# Patient Record
Sex: FEMALE | Race: WHITE | NOT HISPANIC OR LATINO | URBAN - METROPOLITAN AREA
[De-identification: names, ages, dates, MRNs, and addresses within clinical notes are randomized per-mention and may not be internally consistent; named-entity substitution may affect disease eponyms.]

---

## 2020-04-29 ENCOUNTER — OUTPATIENT (OUTPATIENT)
Dept: OUTPATIENT SERVICES | Facility: HOSPITAL | Age: 30
LOS: 1 days | End: 2020-04-29
Payer: COMMERCIAL

## 2020-04-29 DIAGNOSIS — Z3A.00 WEEKS OF GESTATION OF PREGNANCY NOT SPECIFIED: ICD-10-CM

## 2020-04-29 DIAGNOSIS — O26.899 OTHER SPECIFIED PREGNANCY RELATED CONDITIONS, UNSPECIFIED TRIMESTER: ICD-10-CM

## 2020-04-29 LAB
ALBUMIN SERPL ELPH-MCNC: 3.5 G/DL — SIGNIFICANT CHANGE UP (ref 3.3–5)
ALP SERPL-CCNC: 167 U/L — HIGH (ref 40–120)
ALT FLD-CCNC: 58 U/L — HIGH (ref 10–45)
ANION GAP SERPL CALC-SCNC: 11 MMOL/L — SIGNIFICANT CHANGE UP (ref 5–17)
APTT BLD: 26.5 SEC — LOW (ref 27.5–36.3)
AST SERPL-CCNC: 38 U/L — SIGNIFICANT CHANGE UP (ref 10–40)
BASOPHILS # BLD AUTO: 0.03 K/UL — SIGNIFICANT CHANGE UP (ref 0–0.2)
BASOPHILS NFR BLD AUTO: 0.3 % — SIGNIFICANT CHANGE UP (ref 0–2)
BILIRUB SERPL-MCNC: 0.2 MG/DL — SIGNIFICANT CHANGE UP (ref 0.2–1.2)
BUN SERPL-MCNC: 7 MG/DL — SIGNIFICANT CHANGE UP (ref 7–23)
CALCIUM SERPL-MCNC: 9.5 MG/DL — SIGNIFICANT CHANGE UP (ref 8.4–10.5)
CHLORIDE SERPL-SCNC: 104 MMOL/L — SIGNIFICANT CHANGE UP (ref 96–108)
CO2 SERPL-SCNC: 21 MMOL/L — LOW (ref 22–31)
CREAT ?TM UR-MCNC: 68 MG/DL — SIGNIFICANT CHANGE UP
CREAT SERPL-MCNC: 0.68 MG/DL — SIGNIFICANT CHANGE UP (ref 0.5–1.3)
EOSINOPHIL # BLD AUTO: 0.06 K/UL — SIGNIFICANT CHANGE UP (ref 0–0.5)
EOSINOPHIL NFR BLD AUTO: 0.6 % — SIGNIFICANT CHANGE UP (ref 0–6)
FIBRINOGEN PPP-MCNC: 670 MG/DL — HIGH (ref 258–438)
GLUCOSE SERPL-MCNC: 90 MG/DL — SIGNIFICANT CHANGE UP (ref 70–99)
HCT VFR BLD CALC: 38.4 % — SIGNIFICANT CHANGE UP (ref 34.5–45)
HGB BLD-MCNC: 12.6 G/DL — SIGNIFICANT CHANGE UP (ref 11.5–15.5)
IMM GRANULOCYTES NFR BLD AUTO: 0.9 % — SIGNIFICANT CHANGE UP (ref 0–1.5)
INR BLD: 0.86 — LOW (ref 0.88–1.16)
LDH SERPL L TO P-CCNC: 149 U/L — SIGNIFICANT CHANGE UP (ref 50–242)
LYMPHOCYTES # BLD AUTO: 1.6 K/UL — SIGNIFICANT CHANGE UP (ref 1–3.3)
LYMPHOCYTES # BLD AUTO: 16.3 % — SIGNIFICANT CHANGE UP (ref 13–44)
MCHC RBC-ENTMCNC: 27.8 PG — SIGNIFICANT CHANGE UP (ref 27–34)
MCHC RBC-ENTMCNC: 32.8 GM/DL — SIGNIFICANT CHANGE UP (ref 32–36)
MCV RBC AUTO: 84.8 FL — SIGNIFICANT CHANGE UP (ref 80–100)
MONOCYTES # BLD AUTO: 0.58 K/UL — SIGNIFICANT CHANGE UP (ref 0–0.9)
MONOCYTES NFR BLD AUTO: 5.9 % — SIGNIFICANT CHANGE UP (ref 2–14)
NEUTROPHILS # BLD AUTO: 7.44 K/UL — HIGH (ref 1.8–7.4)
NEUTROPHILS NFR BLD AUTO: 76 % — SIGNIFICANT CHANGE UP (ref 43–77)
NRBC # BLD: 0 /100 WBCS — SIGNIFICANT CHANGE UP (ref 0–0)
PLATELET # BLD AUTO: 315 K/UL — SIGNIFICANT CHANGE UP (ref 150–400)
POTASSIUM SERPL-MCNC: 4.4 MMOL/L — SIGNIFICANT CHANGE UP (ref 3.5–5.3)
POTASSIUM SERPL-SCNC: 4.4 MMOL/L — SIGNIFICANT CHANGE UP (ref 3.5–5.3)
PROT ?TM UR-MCNC: 9 MG/DL — SIGNIFICANT CHANGE UP (ref 0–12)
PROT SERPL-MCNC: 6.5 G/DL — SIGNIFICANT CHANGE UP (ref 6–8.3)
PROT/CREAT UR-RTO: 0.1 RATIO — SIGNIFICANT CHANGE UP (ref 0–0.2)
PROTHROM AB SERPL-ACNC: 9.8 SEC — LOW (ref 10–12.9)
RBC # BLD: 4.53 M/UL — SIGNIFICANT CHANGE UP (ref 3.8–5.2)
RBC # FLD: 13.4 % — SIGNIFICANT CHANGE UP (ref 10.3–14.5)
SODIUM SERPL-SCNC: 136 MMOL/L — SIGNIFICANT CHANGE UP (ref 135–145)
URATE SERPL-MCNC: 5.1 MG/DL — SIGNIFICANT CHANGE UP (ref 2.5–7)
WBC # BLD: 9.8 K/UL — SIGNIFICANT CHANGE UP (ref 3.8–10.5)
WBC # FLD AUTO: 9.8 K/UL — SIGNIFICANT CHANGE UP (ref 3.8–10.5)

## 2020-04-29 PROCEDURE — 36415 COLL VENOUS BLD VENIPUNCTURE: CPT

## 2020-04-29 PROCEDURE — 80053 COMPREHEN METABOLIC PANEL: CPT

## 2020-04-29 PROCEDURE — 84550 ASSAY OF BLOOD/URIC ACID: CPT

## 2020-04-29 PROCEDURE — 83615 LACTATE (LD) (LDH) ENZYME: CPT

## 2020-04-29 PROCEDURE — 82570 ASSAY OF URINE CREATININE: CPT

## 2020-04-29 PROCEDURE — 99214 OFFICE O/P EST MOD 30 MIN: CPT

## 2020-04-29 PROCEDURE — 84156 ASSAY OF PROTEIN URINE: CPT

## 2020-04-29 PROCEDURE — 76981 USE PARENCHYMA: CPT | Mod: 26

## 2020-04-29 PROCEDURE — 85610 PROTHROMBIN TIME: CPT

## 2020-04-29 PROCEDURE — 85025 COMPLETE CBC W/AUTO DIFF WBC: CPT

## 2020-04-29 PROCEDURE — 76815 OB US LIMITED FETUS(S): CPT

## 2020-04-29 PROCEDURE — 99213 OFFICE O/P EST LOW 20 MIN: CPT | Mod: 25

## 2020-04-29 PROCEDURE — 85730 THROMBOPLASTIN TIME PARTIAL: CPT

## 2020-04-29 PROCEDURE — 85384 FIBRINOGEN ACTIVITY: CPT

## 2020-05-06 ENCOUNTER — INPATIENT (INPATIENT)
Facility: HOSPITAL | Age: 30
LOS: 1 days | Discharge: HOME | End: 2020-05-08
Attending: OBSTETRICS & GYNECOLOGY | Admitting: OBSTETRICS & GYNECOLOGY
Payer: COMMERCIAL

## 2020-05-06 VITALS — WEIGHT: 238.54 LBS | HEIGHT: 66 IN

## 2020-05-06 LAB
ALBUMIN SERPL ELPH-MCNC: 3.7 G/DL — SIGNIFICANT CHANGE UP (ref 3.3–5)
ALP SERPL-CCNC: 184 U/L — HIGH (ref 40–120)
ALT FLD-CCNC: 32 U/L — SIGNIFICANT CHANGE UP (ref 10–45)
ANION GAP SERPL CALC-SCNC: 15 MMOL/L — SIGNIFICANT CHANGE UP (ref 5–17)
APPEARANCE UR: ABNORMAL
APTT BLD: 25.9 SEC — LOW (ref 27.5–36.3)
AST SERPL-CCNC: 28 U/L — SIGNIFICANT CHANGE UP (ref 10–40)
BASOPHILS # BLD AUTO: 0.02 K/UL — SIGNIFICANT CHANGE UP (ref 0–0.2)
BASOPHILS NFR BLD AUTO: 0.2 % — SIGNIFICANT CHANGE UP (ref 0–2)
BILIRUB SERPL-MCNC: 0.2 MG/DL — SIGNIFICANT CHANGE UP (ref 0.2–1.2)
BILIRUB UR-MCNC: NEGATIVE — SIGNIFICANT CHANGE UP
BLD GP AB SCN SERPL QL: NEGATIVE — SIGNIFICANT CHANGE UP
BUN SERPL-MCNC: 7 MG/DL — SIGNIFICANT CHANGE UP (ref 7–23)
CALCIUM SERPL-MCNC: 9.7 MG/DL — SIGNIFICANT CHANGE UP (ref 8.4–10.5)
CHLORIDE SERPL-SCNC: 104 MMOL/L — SIGNIFICANT CHANGE UP (ref 96–108)
CO2 SERPL-SCNC: 19 MMOL/L — LOW (ref 22–31)
COLOR SPEC: YELLOW — SIGNIFICANT CHANGE UP
CREAT ?TM UR-MCNC: 22 MG/DL — SIGNIFICANT CHANGE UP
CREAT SERPL-MCNC: 0.61 MG/DL — SIGNIFICANT CHANGE UP (ref 0.5–1.3)
DIFF PNL FLD: ABNORMAL
EOSINOPHIL # BLD AUTO: 0.06 K/UL — SIGNIFICANT CHANGE UP (ref 0–0.5)
EOSINOPHIL NFR BLD AUTO: 0.6 % — SIGNIFICANT CHANGE UP (ref 0–6)
GLUCOSE SERPL-MCNC: 127 MG/DL — HIGH (ref 70–99)
GLUCOSE UR QL: NEGATIVE — SIGNIFICANT CHANGE UP
HCT VFR BLD CALC: 38.4 % — SIGNIFICANT CHANGE UP (ref 34.5–45)
HGB BLD-MCNC: 12.8 G/DL — SIGNIFICANT CHANGE UP (ref 11.5–15.5)
IMM GRANULOCYTES NFR BLD AUTO: 0.8 % — SIGNIFICANT CHANGE UP (ref 0–1.5)
INR BLD: 0.91 — SIGNIFICANT CHANGE UP (ref 0.88–1.16)
KETONES UR-MCNC: NEGATIVE — SIGNIFICANT CHANGE UP
LEUKOCYTE ESTERASE UR-ACNC: NEGATIVE — SIGNIFICANT CHANGE UP
LYMPHOCYTES # BLD AUTO: 1.81 K/UL — SIGNIFICANT CHANGE UP (ref 1–3.3)
LYMPHOCYTES # BLD AUTO: 17.1 % — SIGNIFICANT CHANGE UP (ref 13–44)
MCHC RBC-ENTMCNC: 28.1 PG — SIGNIFICANT CHANGE UP (ref 27–34)
MCHC RBC-ENTMCNC: 33.3 GM/DL — SIGNIFICANT CHANGE UP (ref 32–36)
MCV RBC AUTO: 84.4 FL — SIGNIFICANT CHANGE UP (ref 80–100)
MONOCYTES # BLD AUTO: 0.63 K/UL — SIGNIFICANT CHANGE UP (ref 0–0.9)
MONOCYTES NFR BLD AUTO: 5.9 % — SIGNIFICANT CHANGE UP (ref 2–14)
NEUTROPHILS # BLD AUTO: 8 K/UL — HIGH (ref 1.8–7.4)
NEUTROPHILS NFR BLD AUTO: 75.4 % — SIGNIFICANT CHANGE UP (ref 43–77)
NITRITE UR-MCNC: NEGATIVE — SIGNIFICANT CHANGE UP
NRBC # BLD: 0 /100 WBCS — SIGNIFICANT CHANGE UP (ref 0–0)
PH UR: 6.5 — SIGNIFICANT CHANGE UP (ref 5–8)
PLATELET # BLD AUTO: 317 K/UL — SIGNIFICANT CHANGE UP (ref 150–400)
POTASSIUM SERPL-MCNC: 3.8 MMOL/L — SIGNIFICANT CHANGE UP (ref 3.5–5.3)
POTASSIUM SERPL-SCNC: 3.8 MMOL/L — SIGNIFICANT CHANGE UP (ref 3.5–5.3)
PROT ?TM UR-MCNC: <4 MG/DL — SIGNIFICANT CHANGE UP (ref 0–12)
PROT SERPL-MCNC: 6.9 G/DL — SIGNIFICANT CHANGE UP (ref 6–8.3)
PROT UR-MCNC: NEGATIVE MG/DL — SIGNIFICANT CHANGE UP
PROT/CREAT UR-RTO: <0.2 RATIO — SIGNIFICANT CHANGE UP (ref 0–0.2)
PROTHROM AB SERPL-ACNC: 10.3 SEC — SIGNIFICANT CHANGE UP (ref 10–12.9)
RBC # BLD: 4.55 M/UL — SIGNIFICANT CHANGE UP (ref 3.8–5.2)
RBC # FLD: 13.2 % — SIGNIFICANT CHANGE UP (ref 10.3–14.5)
RH IG SCN BLD-IMP: POSITIVE — SIGNIFICANT CHANGE UP
RH IG SCN BLD-IMP: POSITIVE — SIGNIFICANT CHANGE UP
SARS-COV-2 RNA SPEC QL NAA+PROBE: SIGNIFICANT CHANGE UP
SODIUM SERPL-SCNC: 138 MMOL/L — SIGNIFICANT CHANGE UP (ref 135–145)
SP GR SPEC: 1.01 — SIGNIFICANT CHANGE UP (ref 1–1.03)
UROBILINOGEN FLD QL: 0.2 E.U./DL — SIGNIFICANT CHANGE UP
WBC # BLD: 10.6 K/UL — HIGH (ref 3.8–10.5)
WBC # FLD AUTO: 10.6 K/UL — HIGH (ref 3.8–10.5)

## 2020-05-06 RX ORDER — OXYTOCIN 10 UNIT/ML
333.33 VIAL (ML) INJECTION
Qty: 20 | Refills: 0 | Status: DISCONTINUED | OUTPATIENT
Start: 2020-05-06 | End: 2020-05-07

## 2020-05-06 RX ORDER — OXYTOCIN 10 UNIT/ML
1 VIAL (ML) INJECTION
Qty: 30 | Refills: 0 | Status: DISCONTINUED | OUTPATIENT
Start: 2020-05-06 | End: 2020-05-07

## 2020-05-06 RX ORDER — FENTANYL/BUPIVACAINE/NS/PF 2MCG/ML-.1
250 PLASTIC BAG, INJECTION (ML) INJECTION
Refills: 0 | Status: DISCONTINUED | OUTPATIENT
Start: 2020-05-06 | End: 2020-05-07

## 2020-05-06 RX ORDER — SODIUM CHLORIDE 9 MG/ML
1000 INJECTION, SOLUTION INTRAVENOUS
Refills: 0 | Status: DISCONTINUED | OUTPATIENT
Start: 2020-05-06 | End: 2020-05-07

## 2020-05-06 RX ORDER — CITRIC ACID/SODIUM CITRATE 300-500 MG
15 SOLUTION, ORAL ORAL EVERY 6 HOURS
Refills: 0 | Status: DISCONTINUED | OUTPATIENT
Start: 2020-05-06 | End: 2020-05-07

## 2020-05-06 RX ADMIN — Medication 1 MILLIUNIT(S)/MIN: at 17:05

## 2020-05-06 RX ADMIN — SODIUM CHLORIDE 125 MILLILITER(S): 9 INJECTION, SOLUTION INTRAVENOUS at 16:30

## 2020-05-07 LAB — T PALLIDUM AB TITR SER: NEGATIVE — SIGNIFICANT CHANGE UP

## 2020-05-07 PROCEDURE — 88307 TISSUE EXAM BY PATHOLOGIST: CPT | Mod: 26

## 2020-05-07 RX ORDER — MAGNESIUM HYDROXIDE 400 MG/1
30 TABLET, CHEWABLE ORAL
Refills: 0 | Status: DISCONTINUED | OUTPATIENT
Start: 2020-05-07 | End: 2020-05-08

## 2020-05-07 RX ORDER — DIPHENHYDRAMINE HCL 50 MG
25 CAPSULE ORAL EVERY 6 HOURS
Refills: 0 | Status: DISCONTINUED | OUTPATIENT
Start: 2020-05-07 | End: 2020-05-08

## 2020-05-07 RX ORDER — SIMETHICONE 80 MG/1
80 TABLET, CHEWABLE ORAL EVERY 4 HOURS
Refills: 0 | Status: DISCONTINUED | OUTPATIENT
Start: 2020-05-07 | End: 2020-05-08

## 2020-05-07 RX ORDER — SODIUM CHLORIDE 9 MG/ML
3 INJECTION INTRAMUSCULAR; INTRAVENOUS; SUBCUTANEOUS EVERY 8 HOURS
Refills: 0 | Status: DISCONTINUED | OUTPATIENT
Start: 2020-05-07 | End: 2020-05-08

## 2020-05-07 RX ORDER — OXYCODONE HYDROCHLORIDE 5 MG/1
5 TABLET ORAL
Refills: 0 | Status: DISCONTINUED | OUTPATIENT
Start: 2020-05-07 | End: 2020-05-08

## 2020-05-07 RX ORDER — TETANUS TOXOID, REDUCED DIPHTHERIA TOXOID AND ACELLULAR PERTUSSIS VACCINE, ADSORBED 5; 2.5; 8; 8; 2.5 [IU]/.5ML; [IU]/.5ML; UG/.5ML; UG/.5ML; UG/.5ML
0.5 SUSPENSION INTRAMUSCULAR ONCE
Refills: 0 | Status: DISCONTINUED | OUTPATIENT
Start: 2020-05-07 | End: 2020-05-08

## 2020-05-07 RX ORDER — BENZOCAINE 10 %
1 GEL (GRAM) MUCOUS MEMBRANE EVERY 6 HOURS
Refills: 0 | Status: DISCONTINUED | OUTPATIENT
Start: 2020-05-07 | End: 2020-05-08

## 2020-05-07 RX ORDER — KETOROLAC TROMETHAMINE 30 MG/ML
30 SYRINGE (ML) INJECTION ONCE
Refills: 0 | Status: DISCONTINUED | OUTPATIENT
Start: 2020-05-07 | End: 2020-05-07

## 2020-05-07 RX ORDER — AER TRAVELER 0.5 G/1
1 SOLUTION RECTAL; TOPICAL EVERY 4 HOURS
Refills: 0 | Status: DISCONTINUED | OUTPATIENT
Start: 2020-05-07 | End: 2020-05-08

## 2020-05-07 RX ORDER — OXYCODONE HYDROCHLORIDE 5 MG/1
5 TABLET ORAL ONCE
Refills: 0 | Status: DISCONTINUED | OUTPATIENT
Start: 2020-05-07 | End: 2020-05-08

## 2020-05-07 RX ORDER — PRAMOXINE HYDROCHLORIDE 150 MG/15G
1 AEROSOL, FOAM RECTAL EVERY 4 HOURS
Refills: 0 | Status: DISCONTINUED | OUTPATIENT
Start: 2020-05-07 | End: 2020-05-08

## 2020-05-07 RX ORDER — LANOLIN
1 OINTMENT (GRAM) TOPICAL EVERY 6 HOURS
Refills: 0 | Status: DISCONTINUED | OUTPATIENT
Start: 2020-05-07 | End: 2020-05-08

## 2020-05-07 RX ORDER — DIBUCAINE 1 %
1 OINTMENT (GRAM) RECTAL EVERY 6 HOURS
Refills: 0 | Status: DISCONTINUED | OUTPATIENT
Start: 2020-05-07 | End: 2020-05-08

## 2020-05-07 RX ORDER — HYDROCORTISONE 1 %
1 OINTMENT (GRAM) TOPICAL EVERY 6 HOURS
Refills: 0 | Status: DISCONTINUED | OUTPATIENT
Start: 2020-05-07 | End: 2020-05-08

## 2020-05-07 RX ORDER — IBUPROFEN 200 MG
600 TABLET ORAL EVERY 6 HOURS
Refills: 0 | Status: COMPLETED | OUTPATIENT
Start: 2020-05-07 | End: 2021-04-05

## 2020-05-07 RX ORDER — IBUPROFEN 200 MG
600 TABLET ORAL EVERY 6 HOURS
Refills: 0 | Status: DISCONTINUED | OUTPATIENT
Start: 2020-05-07 | End: 2020-05-08

## 2020-05-07 RX ORDER — OXYTOCIN 10 UNIT/ML
333.33 VIAL (ML) INJECTION
Qty: 20 | Refills: 0 | Status: DISCONTINUED | OUTPATIENT
Start: 2020-05-07 | End: 2020-05-08

## 2020-05-07 RX ORDER — ACETAMINOPHEN 500 MG
975 TABLET ORAL
Refills: 0 | Status: DISCONTINUED | OUTPATIENT
Start: 2020-05-07 | End: 2020-05-08

## 2020-05-07 RX ADMIN — Medication 1000 MILLIUNIT(S)/MIN: at 05:15

## 2020-05-07 RX ADMIN — Medication 975 MILLIGRAM(S): at 09:14

## 2020-05-07 RX ADMIN — Medication 30 MILLIGRAM(S): at 06:03

## 2020-05-07 RX ADMIN — Medication 600 MILLIGRAM(S): at 18:18

## 2020-05-07 RX ADMIN — SODIUM CHLORIDE 3 MILLILITER(S): 9 INJECTION INTRAMUSCULAR; INTRAVENOUS; SUBCUTANEOUS at 06:13

## 2020-05-07 RX ADMIN — Medication 975 MILLIGRAM(S): at 16:20

## 2020-05-07 RX ADMIN — Medication 600 MILLIGRAM(S): at 23:35

## 2020-05-07 RX ADMIN — Medication 975 MILLIGRAM(S): at 21:10

## 2020-05-07 NOTE — LACTATION INITIAL EVALUATION - LACTATION INTERVENTIONS
initiate hand expression routine/initiate skin to skin/Met dyad at ~12 hours. DTV/+mec. Positioning and latch strategies taught, and baby was able to latch deeply, sucking rhythmically between periods of rest. Flat/short nipples/brittny with stimulation and baby latches well. Breastfeeding basics and normal  behavior reviewed, including cluster feeding/second night syndrome. Encouraged frequent SSC and to breastfeed in response to cues at least 8-12x/day. Answered all questions for mother and partner. Encouraged to call for additional support as needed from RN or LC.

## 2020-05-08 VITALS
DIASTOLIC BLOOD PRESSURE: 80 MMHG | RESPIRATION RATE: 17 BRPM | OXYGEN SATURATION: 96 % | SYSTOLIC BLOOD PRESSURE: 122 MMHG | HEART RATE: 76 BPM | TEMPERATURE: 98 F

## 2020-05-08 LAB — SURGICAL PATHOLOGY STUDY: SIGNIFICANT CHANGE UP

## 2020-05-08 PROCEDURE — 88307 TISSUE EXAM BY PATHOLOGIST: CPT

## 2020-05-08 PROCEDURE — 84156 ASSAY OF PROTEIN URINE: CPT

## 2020-05-08 PROCEDURE — 85025 COMPLETE CBC W/AUTO DIFF WBC: CPT

## 2020-05-08 PROCEDURE — 87635 SARS-COV-2 COVID-19 AMP PRB: CPT

## 2020-05-08 PROCEDURE — 86901 BLOOD TYPING SEROLOGIC RH(D): CPT

## 2020-05-08 PROCEDURE — 86850 RBC ANTIBODY SCREEN: CPT

## 2020-05-08 PROCEDURE — 36415 COLL VENOUS BLD VENIPUNCTURE: CPT

## 2020-05-08 PROCEDURE — 80053 COMPREHEN METABOLIC PANEL: CPT

## 2020-05-08 PROCEDURE — 81001 URINALYSIS AUTO W/SCOPE: CPT

## 2020-05-08 PROCEDURE — 85730 THROMBOPLASTIN TIME PARTIAL: CPT

## 2020-05-08 PROCEDURE — 85610 PROTHROMBIN TIME: CPT

## 2020-05-08 PROCEDURE — 86780 TREPONEMA PALLIDUM: CPT

## 2020-05-08 PROCEDURE — 82570 ASSAY OF URINE CREATININE: CPT

## 2020-05-08 RX ORDER — ACETAMINOPHEN 500 MG
3 TABLET ORAL
Qty: 0 | Refills: 0 | DISCHARGE
Start: 2020-05-08

## 2020-05-08 RX ORDER — IBUPROFEN 200 MG
1 TABLET ORAL
Qty: 0 | Refills: 0 | DISCHARGE
Start: 2020-05-08

## 2020-05-08 RX ADMIN — Medication 1 APPLICATION(S): at 09:44

## 2020-05-08 RX ADMIN — Medication 975 MILLIGRAM(S): at 03:52

## 2020-05-08 RX ADMIN — Medication 975 MILLIGRAM(S): at 09:44

## 2020-05-08 RX ADMIN — Medication 600 MILLIGRAM(S): at 06:17

## 2020-05-08 RX ADMIN — Medication 1 TABLET(S): at 09:44

## 2020-05-08 RX ADMIN — Medication 600 MILLIGRAM(S): at 12:38

## 2020-05-08 RX ADMIN — Medication 1 SPRAY(S): at 09:44

## 2020-05-08 NOTE — DISCHARGE NOTE OB - HOSPITAL COURSE
Patient is status post a vaginal delivery c/b gestational hypertension. She had an uncomplicated postoperative course and has met her postoperative milestones appropriately.  Vitals are stable for discharge.

## 2020-05-08 NOTE — PROGRESS NOTE ADULT - SUBJECTIVE AND OBJECTIVE BOX
Patient evaluated at bedside this morning, resting comfortable in bed, no acute events overnight.  She reports pain is well controlled with tylenol and motrin.  She denies headache, dizziness, chest pain, palpitations, shortness of breath, nausea, vomiting, heavy vaginal bleeding or perineal discomfort. Reports decrease in amount of vaginal bleeding and denies clots.  She has been ambulating without assistance, voiding spontaneously, and is breastfeeding.   Tolerating food well, without nausea/vomit.  Passing flatus.     Physical Exam:  T(C): 36.7 (05-07-20 @ 21:25), Max: 36.7 (05-07-20 @ 21:25)  HR: 76 (05-07-20 @ 21:25) (75 - 76)  BP: 116/78 (05-07-20 @ 21:25) (116/78 - 118/78)  RR: 18 (05-07-20 @ 21:25) (18 - 18)  SpO2: 97% (05-07-20 @ 21:25) (97% - 97%)    GA: NAD, A&O x 3  CV: RRR, no murmurs, rubs, or gallops  Pulm: clear breath sounds throughout, no rales, rhonchi, wheezes  Abd: + BS, soft, nontender, nondistended, no rebound or guarding, uterus firm at midline and below umbilicus  Extremities: no swelling or calf tenderness  Perineum: normal lochia, intact, healing well, no hematoma                          12.8   10.60 )-----------( 317      ( 06 May 2020 15:31 )             38.4     05-06    138  |  104  |  7   ----------------------------<  127<H>  3.8   |  19<L>  |  0.61    Ca    9.7      06 May 2020 15:31    TPro  6.9  /  Alb  3.7  /  TBili  0.2  /  DBili  x   /  AST  28  /  ALT  32  /  AlkPhos  184<H>  05-06    acetaminophen   Tablet .. 975 milliGRAM(s) Oral <User Schedule>  benzocaine 20%/menthol 0.5% Spray 1 Spray(s) Topical every 6 hours PRN  dibucaine 1% Ointment 1 Application(s) Topical every 6 hours PRN  diphenhydrAMINE 25 milliGRAM(s) Oral every 6 hours PRN  diphtheria/tetanus/pertussis (acellular) Vaccine (ADAcel) 0.5 milliLiter(s) IntraMuscular once  hydrocortisone 1% Cream 1 Application(s) Topical every 6 hours PRN  ibuprofen  Tablet. 600 milliGRAM(s) Oral every 6 hours  lanolin Ointment 1 Application(s) Topical every 6 hours PRN  magnesium hydroxide Suspension 30 milliLiter(s) Oral two times a day PRN  oxyCODONE    IR 5 milliGRAM(s) Oral every 3 hours PRN  oxyCODONE    IR 5 milliGRAM(s) Oral once PRN  oxytocin Infusion 333.333 milliUNIT(s)/Min IV Continuous <Continuous>  pramoxine 1%/zinc 5% Cream 1 Application(s) Topical every 4 hours PRN  prenatal multivitamin 1 Tablet(s) Oral daily  simethicone 80 milliGRAM(s) Chew every 4 hours PRN  sodium chloride 0.9% lock flush 3 milliLiter(s) IV Push every 8 hours  witch hazel Pads 1 Application(s) Topical every 4 hours PRN

## 2020-05-08 NOTE — DISCHARGE NOTE OB - MATERIALS PROVIDED
[facility] [place] Screening Program/Breastfeeding Log/Back To Sleep Handout/[place]  Immunization Record/Bottle Feeding Log/Breastfeeding Guide and Packet/Guide to Postpartum Care/[facility] Hearing Screen Program/Discharge Medication Information for Patients and Families Pocket Guide/Vaccinations/Tdap Vaccination (VIS Pub Date: 2012)/Breastfeeding Mother’s Support Group Information/Shaken Baby Prevention Handout/Birth Certificate Instructions

## 2020-05-08 NOTE — PROGRESS NOTE ADULT - ATTENDING COMMENTS
Patient seen and examined, agree with assessment and plan.  PPD1 s/p LFD, doing well, cleared for DC home today.    Dr. Kulkarni, Attending

## 2020-05-08 NOTE — DISCHARGE NOTE OB - CARE PROVIDER_API CALL
Barbara Guerra)  Obstetrics and Gynecology  [address] [place] [address]  Phone: [phone]  Fax: [phone]  Follow Up Time:

## 2020-05-08 NOTE — DISCHARGE NOTE OB - PLAN OF CARE
discharge home Take Motrin 600mg every 6 hours and/or tylenol 650mg every 6 hours as needed for pain. Call your OB to schedule a follow up appointment in 6 weeks. Nothing per vagina until cleared by your OB - no intercourse, douching, tampons, etc.  Call your OB if you experience severe abdominal pain not improved by oral pain medications, heavy bright red vaginal bleeding saturating more than 1 pad per hour, or fever greater than 100.4F. Consider contraception options to be discussed with your OB. blood pressure monitoring Follow up with your OB in one week for a Bloodpressure check.  Purchase electronic blood pressure cuff at your local pharmacy. Check blood pressure 3x a day. If bp >160/110, you develop a headache not relieved by tylenol, visual disturbances, or right upper abdominal pain, call your doctor or the hospital, or go to your nearest emergency room. Regular diet, normal activity, nothing in the vagina for 6 weeks--no sex, tampons, tub baths, or swimming pools.

## 2020-05-08 NOTE — PROGRESS NOTE ADULT - ASSESSMENT
A/P 29y s/p FAVD, PPD #1  , stable, meeting postpartum milestones   - Pain: well controlled on motrin and tylenol  - GI: Tolerating regular diet   - : urinating without difficulty/pain  - DVT prophylaxis: ambulating frequently  - Dispo: PPD 2, unless otherwise specified

## 2020-05-08 NOTE — DISCHARGE NOTE OB - MEDICATION SUMMARY - MEDICATIONS TO TAKE
I will START or STAY ON the medications listed below when I get home from the hospital:    acetaminophen 325 mg oral tablet  -- 3 tab(s) by mouth   -- Indication: For for pain as needed    ibuprofen 600 mg oral tablet  -- 1 tab(s) by mouth every 6 hours  -- Indication: For for pain as needed

## 2020-05-08 NOTE — DISCHARGE NOTE OB - CARE PLAN
Principal Discharge DX:	Postpartum state  Goal:	discharge home  Assessment and plan of treatment:	Take Motrin 600mg every 6 hours and/or tylenol 650mg every 6 hours as needed for pain. Call your OB to schedule a follow up appointment in 6 weeks. Nothing per vagina until cleared by your OB - no intercourse, douching, tampons, etc.  Call your OB if you experience severe abdominal pain not improved by oral pain medications, heavy bright red vaginal bleeding saturating more than 1 pad per hour, or fever greater than 100.4F. Consider contraception options to be discussed with your OB.  Secondary Diagnosis:	Gestational hypertension, third trimester  Goal:	blood pressure monitoring  Assessment and plan of treatment:	Follow up with your OB in one week for a Bloodpressure check.  Purchase electronic blood pressure cuff at your local pharmacy. Check blood pressure 3x a day. If bp >160/110, you develop a headache not relieved by tylenol, visual disturbances, or right upper abdominal pain, call your doctor or the hospital, or go to your nearest emergency room. Regular diet, normal activity, nothing in the vagina for 6 weeks--no sex, tampons, tub baths, or swimming pools.

## 2020-05-08 NOTE — DISCHARGE NOTE OB - PATIENT PORTAL LINK FT
You can access the FollowMyHealth Patient Portal offered by [facility] by registering at the following website: [email or web address]. By joining [email or web address] FollowMyHealth portal, you will also be able to view your health information using other applications (apps) compatible with our system.

## 2020-05-11 DIAGNOSIS — Z3A.41 41 WEEKS GESTATION OF PREGNANCY: ICD-10-CM

## 2020-05-11 DIAGNOSIS — O41.03X0 OLIGOHYDRAMNIOS, THIRD TRIMESTER, NOT APPLICABLE OR UNSPECIFIED: ICD-10-CM

## 2020-05-11 DIAGNOSIS — O48.0 POST-TERM PREGNANCY: ICD-10-CM

## 2023-01-04 ENCOUNTER — INPATIENT (INPATIENT)
Facility: HOSPITAL | Age: 33
LOS: 1 days | Discharge: HOME | End: 2023-01-06
Attending: OBSTETRICS & GYNECOLOGY | Admitting: OBSTETRICS & GYNECOLOGY
Payer: COMMERCIAL

## 2023-01-04 DIAGNOSIS — Z91.018 ALLERGY TO OTHER FOODS: ICD-10-CM

## 2023-01-04 DIAGNOSIS — Z28.21 IMMUNIZATION NOT CARRIED OUT BECAUSE OF PATIENT REFUSAL: ICD-10-CM

## 2023-01-04 DIAGNOSIS — Z3A.39 39 WEEKS GESTATION OF PREGNANCY: ICD-10-CM

## 2023-01-04 DIAGNOSIS — O36.63X0 MATERNAL CARE FOR EXCESSIVE FETAL GROWTH, THIRD TRIMESTER, NOT APPLICABLE OR UNSPECIFIED: ICD-10-CM

## 2023-01-04 DIAGNOSIS — R21 RASH AND OTHER NONSPECIFIC SKIN ERUPTION: ICD-10-CM

## 2023-01-04 DIAGNOSIS — Z28.09 IMMUNIZATION NOT CARRIED OUT BECAUSE OF OTHER CONTRAINDICATION: ICD-10-CM

## 2023-01-04 RX ORDER — CHLORHEXIDINE GLUCONATE 213 G/1000ML
1 SOLUTION TOPICAL ONCE
Refills: 0 | Status: DISCONTINUED | OUTPATIENT
Start: 2023-01-04 | End: 2023-01-05

## 2023-01-04 RX ORDER — CITRIC ACID/SODIUM CITRATE 300-500 MG
15 SOLUTION, ORAL ORAL EVERY 6 HOURS
Refills: 0 | Status: DISCONTINUED | OUTPATIENT
Start: 2023-01-04 | End: 2023-01-05

## 2023-01-04 RX ORDER — SODIUM CHLORIDE 9 MG/ML
1000 INJECTION, SOLUTION INTRAVENOUS
Refills: 0 | Status: DISCONTINUED | OUTPATIENT
Start: 2023-01-04 | End: 2023-01-05

## 2023-01-04 RX ORDER — OXYTOCIN 10 UNIT/ML
333.33 VIAL (ML) INJECTION
Qty: 20 | Refills: 0 | Status: DISCONTINUED | OUTPATIENT
Start: 2023-01-04 | End: 2023-01-05

## 2023-01-04 RX ADMIN — SODIUM CHLORIDE 125 MILLILITER(S): 9 INJECTION, SOLUTION INTRAVENOUS at 23:42

## 2023-01-05 VITALS — HEIGHT: 66 IN | WEIGHT: 270.07 LBS

## 2023-01-05 LAB
BASOPHILS # BLD AUTO: 0.04 K/UL — SIGNIFICANT CHANGE UP (ref 0–0.2)
BASOPHILS NFR BLD AUTO: 0.3 % — SIGNIFICANT CHANGE UP (ref 0–2)
BLD GP AB SCN SERPL QL: NEGATIVE — SIGNIFICANT CHANGE UP
COVID-19 SPIKE DOMAIN AB INTERP: POSITIVE
COVID-19 SPIKE DOMAIN ANTIBODY RESULT: >250 U/ML — HIGH
EOSINOPHIL # BLD AUTO: 0.12 K/UL — SIGNIFICANT CHANGE UP (ref 0–0.5)
EOSINOPHIL NFR BLD AUTO: 1 % — SIGNIFICANT CHANGE UP (ref 0–6)
HCT VFR BLD CALC: 36.9 % — SIGNIFICANT CHANGE UP (ref 34.5–45)
HGB BLD-MCNC: 12 G/DL — SIGNIFICANT CHANGE UP (ref 11.5–15.5)
IMM GRANULOCYTES NFR BLD AUTO: 1.1 % — HIGH (ref 0–0.9)
LYMPHOCYTES # BLD AUTO: 19.3 % — SIGNIFICANT CHANGE UP (ref 13–44)
LYMPHOCYTES # BLD AUTO: 2.28 K/UL — SIGNIFICANT CHANGE UP (ref 1–3.3)
MCHC RBC-ENTMCNC: 27.5 PG — SIGNIFICANT CHANGE UP (ref 27–34)
MCHC RBC-ENTMCNC: 32.5 GM/DL — SIGNIFICANT CHANGE UP (ref 32–36)
MCV RBC AUTO: 84.6 FL — SIGNIFICANT CHANGE UP (ref 80–100)
MONOCYTES # BLD AUTO: 0.82 K/UL — SIGNIFICANT CHANGE UP (ref 0–0.9)
MONOCYTES NFR BLD AUTO: 6.9 % — SIGNIFICANT CHANGE UP (ref 2–14)
NEUTROPHILS # BLD AUTO: 8.42 K/UL — HIGH (ref 1.8–7.4)
NEUTROPHILS NFR BLD AUTO: 71.4 % — SIGNIFICANT CHANGE UP (ref 43–77)
NRBC # BLD: 0 /100 WBCS — SIGNIFICANT CHANGE UP (ref 0–0)
PLATELET # BLD AUTO: 335 K/UL — SIGNIFICANT CHANGE UP (ref 150–400)
RBC # BLD: 4.36 M/UL — SIGNIFICANT CHANGE UP (ref 3.8–5.2)
RBC # FLD: 13.2 % — SIGNIFICANT CHANGE UP (ref 10.3–14.5)
RH IG SCN BLD-IMP: POSITIVE — SIGNIFICANT CHANGE UP
RH IG SCN BLD-IMP: POSITIVE — SIGNIFICANT CHANGE UP
SARS-COV-2 IGG+IGM SERPL QL IA: >250 U/ML — HIGH
SARS-COV-2 IGG+IGM SERPL QL IA: POSITIVE
T PALLIDUM AB TITR SER: NEGATIVE — SIGNIFICANT CHANGE UP
WBC # BLD: 11.81 K/UL — HIGH (ref 3.8–10.5)
WBC # FLD AUTO: 11.81 K/UL — HIGH (ref 3.8–10.5)

## 2023-01-05 RX ORDER — BENZOCAINE 10 %
1 GEL (GRAM) MUCOUS MEMBRANE EVERY 6 HOURS
Refills: 0 | Status: DISCONTINUED | OUTPATIENT
Start: 2023-01-05 | End: 2023-01-06

## 2023-01-05 RX ORDER — TETANUS TOXOID, REDUCED DIPHTHERIA TOXOID AND ACELLULAR PERTUSSIS VACCINE, ADSORBED 5; 2.5; 8; 8; 2.5 [IU]/.5ML; [IU]/.5ML; UG/.5ML; UG/.5ML; UG/.5ML
0.5 SUSPENSION INTRAMUSCULAR ONCE
Refills: 0 | Status: DISCONTINUED | OUTPATIENT
Start: 2023-01-05 | End: 2023-01-06

## 2023-01-05 RX ORDER — SIMETHICONE 80 MG/1
80 TABLET, CHEWABLE ORAL EVERY 4 HOURS
Refills: 0 | Status: DISCONTINUED | OUTPATIENT
Start: 2023-01-05 | End: 2023-01-06

## 2023-01-05 RX ORDER — LANOLIN
1 OINTMENT (GRAM) TOPICAL EVERY 6 HOURS
Refills: 0 | Status: DISCONTINUED | OUTPATIENT
Start: 2023-01-05 | End: 2023-01-06

## 2023-01-05 RX ORDER — OXYTOCIN 10 UNIT/ML
6 VIAL (ML) INJECTION
Qty: 30 | Refills: 0 | Status: DISCONTINUED | OUTPATIENT
Start: 2023-01-05 | End: 2023-01-06

## 2023-01-05 RX ORDER — AER TRAVELER 0.5 G/1
1 SOLUTION RECTAL; TOPICAL EVERY 4 HOURS
Refills: 0 | Status: DISCONTINUED | OUTPATIENT
Start: 2023-01-05 | End: 2023-01-06

## 2023-01-05 RX ORDER — KETOROLAC TROMETHAMINE 30 MG/ML
30 SYRINGE (ML) INJECTION ONCE
Refills: 0 | Status: DISCONTINUED | OUTPATIENT
Start: 2023-01-05 | End: 2023-01-05

## 2023-01-05 RX ORDER — OXYTOCIN 10 UNIT/ML
41.67 VIAL (ML) INJECTION
Qty: 20 | Refills: 0 | Status: DISCONTINUED | OUTPATIENT
Start: 2023-01-05 | End: 2023-01-06

## 2023-01-05 RX ORDER — HYDROCORTISONE 1 %
1 OINTMENT (GRAM) TOPICAL EVERY 6 HOURS
Refills: 0 | Status: DISCONTINUED | OUTPATIENT
Start: 2023-01-05 | End: 2023-01-06

## 2023-01-05 RX ORDER — MAGNESIUM HYDROXIDE 400 MG/1
30 TABLET, CHEWABLE ORAL
Refills: 0 | Status: DISCONTINUED | OUTPATIENT
Start: 2023-01-05 | End: 2023-01-06

## 2023-01-05 RX ORDER — DIBUCAINE 1 %
1 OINTMENT (GRAM) RECTAL EVERY 6 HOURS
Refills: 0 | Status: DISCONTINUED | OUTPATIENT
Start: 2023-01-05 | End: 2023-01-06

## 2023-01-05 RX ORDER — PRAMOXINE HYDROCHLORIDE 150 MG/15G
1 AEROSOL, FOAM RECTAL EVERY 4 HOURS
Refills: 0 | Status: DISCONTINUED | OUTPATIENT
Start: 2023-01-05 | End: 2023-01-06

## 2023-01-05 RX ORDER — OXYTOCIN 10 UNIT/ML
VIAL (ML) INJECTION
Qty: 30 | Refills: 0 | Status: DISCONTINUED | OUTPATIENT
Start: 2023-01-05 | End: 2023-01-05

## 2023-01-05 RX ORDER — FENTANYL/BUPIVACAINE/NS/PF 2MCG/ML-.1
250 PLASTIC BAG, INJECTION (ML) INJECTION
Refills: 0 | Status: DISCONTINUED | OUTPATIENT
Start: 2023-01-05 | End: 2023-01-06

## 2023-01-05 RX ORDER — IBUPROFEN 200 MG
600 TABLET ORAL EVERY 6 HOURS
Refills: 0 | Status: DISCONTINUED | OUTPATIENT
Start: 2023-01-05 | End: 2023-01-06

## 2023-01-05 RX ORDER — ACETAMINOPHEN 500 MG
1000 TABLET ORAL ONCE
Refills: 0 | Status: COMPLETED | OUTPATIENT
Start: 2023-01-05 | End: 2023-01-05

## 2023-01-05 RX ORDER — DIPHENHYDRAMINE HCL 50 MG
25 CAPSULE ORAL EVERY 6 HOURS
Refills: 0 | Status: DISCONTINUED | OUTPATIENT
Start: 2023-01-05 | End: 2023-01-06

## 2023-01-05 RX ORDER — ACETAMINOPHEN 500 MG
975 TABLET ORAL
Refills: 0 | Status: DISCONTINUED | OUTPATIENT
Start: 2023-01-05 | End: 2023-01-06

## 2023-01-05 RX ORDER — IBUPROFEN 200 MG
600 TABLET ORAL EVERY 6 HOURS
Refills: 0 | Status: COMPLETED | OUTPATIENT
Start: 2023-01-05 | End: 2023-12-04

## 2023-01-05 RX ORDER — SODIUM CHLORIDE 9 MG/ML
3 INJECTION INTRAMUSCULAR; INTRAVENOUS; SUBCUTANEOUS EVERY 8 HOURS
Refills: 0 | Status: DISCONTINUED | OUTPATIENT
Start: 2023-01-05 | End: 2023-01-06

## 2023-01-05 RX ADMIN — Medication 1 APPLICATION(S): at 22:09

## 2023-01-05 RX ADMIN — Medication 400 MILLIGRAM(S): at 06:28

## 2023-01-05 RX ADMIN — Medication 125 MILLIUNIT(S)/MIN: at 10:57

## 2023-01-05 RX ADMIN — Medication 2 MILLIUNIT(S)/MIN: at 06:10

## 2023-01-05 RX ADMIN — SODIUM CHLORIDE 125 MILLILITER(S): 9 INJECTION, SOLUTION INTRAVENOUS at 05:51

## 2023-01-05 RX ADMIN — Medication 1 SPRAY(S): at 22:09

## 2023-01-05 RX ADMIN — Medication 975 MILLIGRAM(S): at 22:15

## 2023-01-05 RX ADMIN — Medication 30 MILLIGRAM(S): at 11:46

## 2023-01-05 RX ADMIN — Medication 1000 MILLIUNIT(S)/MIN: at 12:09

## 2023-01-05 RX ADMIN — Medication 600 MILLIGRAM(S): at 18:11

## 2023-01-05 RX ADMIN — SODIUM CHLORIDE 3 MILLILITER(S): 9 INJECTION INTRAMUSCULAR; INTRAVENOUS; SUBCUTANEOUS at 21:55

## 2023-01-05 RX ADMIN — Medication 975 MILLIGRAM(S): at 21:46

## 2023-01-05 RX ADMIN — Medication 975 MILLIGRAM(S): at 15:25

## 2023-01-05 RX ADMIN — SODIUM CHLORIDE 125 MILLILITER(S): 9 INJECTION, SOLUTION INTRAVENOUS at 09:40

## 2023-01-05 NOTE — LACTATION INITIAL EVALUATION - LACTATION INTERVENTIONS
initiate/review safe skin-to-skin/initiate/review hand expression/initiate/review pumping guidelines and safe milk handling/initiate/review techniques for position and latch/post discharge community resources provided/initiate/review supplementation plan due to medical indications/review techniques to increase milk supply/review techniques to manage sore nipples/engorgement/initiate/review breast massage/compression/reviewed components of an effective feeding and at least 8 effective feedings per day required/reviewed importance of monitoring infant diapers, the breastfeeding log, and minimum output each day/reviewed risks of unnecessary formula supplementation/reviewed risks of artificial nipples/reviewed strategies to transition to breastfeeding only/reviewed benefits and recommendations for rooming in/reviewed feeding on demand/by cue at least 8 times a day/recommended follow-up with pediatrician within 24 hours of discharge/reviewed indications of inadequate milk transfer that would require supplementation

## 2023-01-05 NOTE — PATIENT PROFILE OB - FUNCTIONAL ASSESSMENT - BASIC MOBILITY 6.
4-calculated by average/Not able to assess (calculate score using [facility] averaging method)

## 2023-01-05 NOTE — PATIENT PROFILE OB - FALL HARM RISK - UNIVERSAL INTERVENTIONS
Bed in lowest position, wheels locked, appropriate side rails in place/Call bell, personal items and telephone in reach/Instruct patient to call for assistance before getting out of bed or chair/Non-slip footwear when patient is out of bed/[place] to call system/Physically safe environment - no spills, clutter or unnecessary equipment/Purposeful Proactive Rounding/Room/bathroom lighting operational, light cord in reach

## 2023-01-05 NOTE — LACTATION INITIAL EVALUATION - NS LACT CON REASON FOR REQ
Dyad seen at 11hrs post birth. Mom  1st child (1yrs old) for almost 2yrs with full supply. Demonstrate hand expressing colostrum, bilateral copious expressible colostrum present. Mom comfortable with latching and positioning. Discuss how to maintain and increase breast milk supply and benefits of exclusively breastfeeding./multiparous mom

## 2023-01-05 NOTE — PATIENT PROFILE OB - BLOOD TYPED: DATE, OB PROFILE
Pt called and stated she was seen on 09/29/2017. Pt stated at that visit she complained of having a cough for over a month. Pt stated she was sent to labor and delivery and was given Flovent and Promethazine with codeine. Pt states that labor and delivery told her to call Dr. Euceda if her cough did not get better within a week. Pt states she was calling to inform Dr. Euceda that her cough is still present and is not any better. Pt states when she takes the promethazine-codeine she becomes very drowsy.    24-Jun-2023

## 2023-01-06 VITALS
SYSTOLIC BLOOD PRESSURE: 130 MMHG | RESPIRATION RATE: 18 BRPM | OXYGEN SATURATION: 97 % | HEART RATE: 82 BPM | DIASTOLIC BLOOD PRESSURE: 84 MMHG | TEMPERATURE: 98 F

## 2023-01-06 PROCEDURE — 86769 SARS-COV-2 COVID-19 ANTIBODY: CPT

## 2023-01-06 PROCEDURE — 36415 COLL VENOUS BLD VENIPUNCTURE: CPT

## 2023-01-06 PROCEDURE — 86901 BLOOD TYPING SEROLOGIC RH(D): CPT

## 2023-01-06 PROCEDURE — 59050 FETAL MONITOR W/REPORT: CPT

## 2023-01-06 PROCEDURE — 86900 BLOOD TYPING SEROLOGIC ABO: CPT

## 2023-01-06 PROCEDURE — 85025 COMPLETE CBC W/AUTO DIFF WBC: CPT

## 2023-01-06 PROCEDURE — 86780 TREPONEMA PALLIDUM: CPT

## 2023-01-06 PROCEDURE — 86850 RBC ANTIBODY SCREEN: CPT

## 2023-01-06 RX ORDER — ACETAMINOPHEN 500 MG
3 TABLET ORAL
Qty: 0 | Refills: 0 | DISCHARGE
Start: 2023-01-06

## 2023-01-06 RX ORDER — HYDROCORTISONE 1 %
1 OINTMENT (GRAM) TOPICAL
Refills: 0 | Status: DISCONTINUED | OUTPATIENT
Start: 2023-01-06 | End: 2023-01-06

## 2023-01-06 RX ORDER — IBUPROFEN 200 MG
1 TABLET ORAL
Qty: 0 | Refills: 0 | DISCHARGE
Start: 2023-01-06

## 2023-01-06 RX ADMIN — Medication 600 MILLIGRAM(S): at 12:45

## 2023-01-06 RX ADMIN — Medication 975 MILLIGRAM(S): at 03:45

## 2023-01-06 RX ADMIN — Medication 600 MILLIGRAM(S): at 00:55

## 2023-01-06 RX ADMIN — Medication 600 MILLIGRAM(S): at 07:00

## 2023-01-06 RX ADMIN — Medication 600 MILLIGRAM(S): at 06:29

## 2023-01-06 RX ADMIN — Medication 975 MILLIGRAM(S): at 03:17

## 2023-01-06 RX ADMIN — Medication 1 TABLET(S): at 12:46

## 2023-01-06 RX ADMIN — Medication 600 MILLIGRAM(S): at 00:34

## 2023-01-06 NOTE — PROGRESS NOTE ADULT - SUBJECTIVE AND OBJECTIVE BOX
Patient evaluated at bedside this morning, resting comfortable in bed, no acute events overnight.  She reports pain is well controlled with tylenol and motrin.  She denies headache, dizziness, chest pain, palpitations, shortness of breath, nausea, vomiting, fever, chills, heavy vaginal bleeding. She has been ambulating without assistance, voiding spontaneously.  Tolerating food well, without nausea/vomit.      Physical Exam:  T(C): 36.4 (01-06-23 @ 06:13), Max: 36.7 (01-05-23 @ 21:26)  HR: 76 (01-06-23 @ 06:13) (76 - 83)  BP: 115/82 (01-06-23 @ 06:13) (96/62 - 119/79)  RR: 17 (01-06-23 @ 06:13) (16 - 17)  SpO2: 96% (01-06-23 @ 06:13) (95% - 96%)    GA: NAD, A&O x 3  Pulm: no increased work of breathing  Abd: soft, nontender, nondistended, no rebound or guarding, uterus firm.  Extremities: no swelling or calf tenderness                          12.0   11.81 )-----------( 335      ( 05 Jan 2023 00:02 )             36.9           acetaminophen     Tablet .. 975 milliGRAM(s) Oral <User Schedule>  benzocaine 20%/menthol 0.5% Spray 1 Spray(s) Topical every 6 hours PRN  dibucaine 1% Ointment 1 Application(s) Topical every 6 hours PRN  diphenhydrAMINE 25 milliGRAM(s) Oral every 6 hours PRN  diphtheria/tetanus/pertussis (acellular) Vaccine (Adacel) 0.5 milliLiter(s) IntraMuscular once  fentanyl (2 MICROgram(s)/mL) + bupivacaine 0.0625%  in 0.9% Sodium Chloride PCEA 250 milliLiter(s) Epidural PCA Continuous  hydrocortisone 1% Cream 1 Application(s) Topical every 6 hours PRN  ibuprofen  Tablet. 600 milliGRAM(s) Oral every 6 hours  lanolin Ointment 1 Application(s) Topical every 6 hours PRN  magnesium hydroxide Suspension 30 milliLiter(s) Oral two times a day PRN  oxytocin Infusion 41.667 milliUNIT(s)/Min IV Continuous <Continuous>  oxytocin Infusion. 6 milliUNIT(s)/Min IV Continuous <Continuous>  pramoxine 1%/zinc 5% Cream 1 Application(s) Topical every 4 hours PRN  prenatal multivitamin 1 Tablet(s) Oral daily  simethicone 80 milliGRAM(s) Chew every 4 hours PRN  sodium chloride 0.9% lock flush 3 milliLiter(s) IV Push every 8 hours  witch hazel Pads 1 Application(s) Topical every 4 hours PRN

## 2023-01-06 NOTE — DISCHARGE NOTE OB - HOSPITAL COURSE
Patient is status post a vaginal delivery after term IOL. She had an uncomplicated postpartum course and has met her postpartum milestones appropriately.  Vitals are stable for discharge.

## 2023-01-06 NOTE — DISCHARGE NOTE OB - PATIENT PORTAL LINK FT
You can access the FollowMyHealth Patient Portal offered by [facility] by registering at the following website: [email or web address]. By joining [facility]’s FollowMyHealth portal, you will also be able to view your health information using other applications (apps) compatible with our system.

## 2023-01-06 NOTE — DISCHARGE NOTE OB - CARE PROVIDER_API CALL
Barbara Guerra  OBSTETRICS AND GYNECOLOGY  [address]  [place] [address]  Phone: [phone]  Fax: [phone]  Follow Up Time:

## 2023-01-06 NOTE — DISCHARGE NOTE OB - NS MD DC FALL RISK RISK
For information on Fall & Injury Prevention, visit: [email or web address] OR  [email or web address] OR  https://www.cdc.gov/steadi/patient.html

## 2023-01-06 NOTE — PROGRESS NOTE ADULT - ATTENDING COMMENTS
Pt seen at bedside  Doing well in general. Just realized this morning while taking shower, she saw large rash on her right lower abdomen.  Now she saw she feels itchy but that was not what she is bothered.  Had small area right after delivery on right elbow where IV was probably with tape. Had rash under the breast during pregnancy. Observe with hydrocortisone cream or benadryl topical.   Advised to call me if expanding towards the afternoon. No new medications overnight.  Incision D/C/I , care reviewed   Abdomen benign exam  Bleeding minimal   Deepa is now P2 PPD#1 s/p uncomplicated .  Circ done this am.   Breast feeding  Pain meds reviewed   Discharge home and see in office in 6 wks

## 2023-08-16 NOTE — PATIENT PROFILE OB - PRO PRENATAL LABS ORI SOURCE HIV
..All Physician EMTALA paperwork  completed.              Ananda Sinclair [facility]  08/16/23 [id number] hard copy, drawn during this pregnancy

## 2025-01-06 NOTE — PATIENT PROFILE OB - PAIN SCALE PREFERRED, PROFILE
numerical 0-10

## 2025-05-06 NOTE — PROGRESS NOTE ADULT - PROVIDER SPECIALTY LIST ADULT
NST note:  This is a 35 year old  28w5d who presented to triage for abdominal pain.     Baseline: 135  Accelerations: Present, x2 10x10  Decelerations: Absent  Variability: Moderate  Contractions: None     Reactive NST.    Hailey Mena MD     OB